# Patient Record
Sex: FEMALE | Race: WHITE | NOT HISPANIC OR LATINO | URBAN - METROPOLITAN AREA
[De-identification: names, ages, dates, MRNs, and addresses within clinical notes are randomized per-mention and may not be internally consistent; named-entity substitution may affect disease eponyms.]

---

## 2018-11-28 NOTE — PATIENT DISCUSSION
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 OCT 5 line raster done today.  Will refer to retina specialist ""

## 2019-07-10 ENCOUNTER — IMPORTED ENCOUNTER (OUTPATIENT)
Dept: URBAN - METROPOLITAN AREA CLINIC 50 | Facility: CLINIC | Age: 64
End: 2019-07-10

## 2019-07-11 ENCOUNTER — IMPORTED ENCOUNTER (OUTPATIENT)
Dept: URBAN - METROPOLITAN AREA CLINIC 50 | Facility: CLINIC | Age: 64
End: 2019-07-11

## 2019-07-12 ENCOUNTER — IMPORTED ENCOUNTER (OUTPATIENT)
Dept: URBAN - METROPOLITAN AREA CLINIC 50 | Facility: CLINIC | Age: 64
End: 2019-07-12

## 2019-08-12 ENCOUNTER — IMPORTED ENCOUNTER (OUTPATIENT)
Dept: URBAN - METROPOLITAN AREA CLINIC 50 | Facility: CLINIC | Age: 64
End: 2019-08-12

## 2019-08-12 NOTE — PATIENT DISCUSSION
"""Based on increased c/d ratio OU. IOP stable.  Testing has been started and will evaluate results ""

## 2019-08-13 ENCOUNTER — IMPORTED ENCOUNTER (OUTPATIENT)
Dept: URBAN - METROPOLITAN AREA CLINIC 50 | Facility: CLINIC | Age: 64
End: 2019-08-13

## 2019-10-10 ENCOUNTER — IMPORTED ENCOUNTER (OUTPATIENT)
Dept: URBAN - METROPOLITAN AREA CLINIC 50 | Facility: CLINIC | Age: 64
End: 2019-10-10

## 2019-11-14 ENCOUNTER — IMPORTED ENCOUNTER (OUTPATIENT)
Dept: URBAN - METROPOLITAN AREA CLINIC 50 | Facility: CLINIC | Age: 64
End: 2019-11-14

## 2020-06-03 ENCOUNTER — IMPORTED ENCOUNTER (OUTPATIENT)
Dept: URBAN - METROPOLITAN AREA CLINIC 50 | Facility: CLINIC | Age: 65
End: 2020-06-03

## 2020-06-04 ENCOUNTER — IMPORTED ENCOUNTER (OUTPATIENT)
Dept: URBAN - METROPOLITAN AREA CLINIC 50 | Facility: CLINIC | Age: 65
End: 2020-06-04

## 2020-12-10 ENCOUNTER — IMPORTED ENCOUNTER (OUTPATIENT)
Dept: URBAN - METROPOLITAN AREA CLINIC 50 | Facility: CLINIC | Age: 65
End: 2020-12-10

## 2021-02-19 ENCOUNTER — IMPORTED ENCOUNTER (OUTPATIENT)
Dept: URBAN - METROPOLITAN AREA CLINIC 50 | Facility: CLINIC | Age: 66
End: 2021-02-19

## 2021-03-01 ENCOUNTER — IMPORTED ENCOUNTER (OUTPATIENT)
Dept: URBAN - METROPOLITAN AREA CLINIC 50 | Facility: CLINIC | Age: 66
End: 2021-03-01

## 2021-03-08 ENCOUNTER — IMPORTED ENCOUNTER (OUTPATIENT)
Dept: URBAN - METROPOLITAN AREA CLINIC 50 | Facility: CLINIC | Age: 66
End: 2021-03-08

## 2021-03-22 NOTE — PATIENT DISCUSSION
"""H/o peripapillary CNVM OS. Treated with Avastin injection by Dr. Barron Zamudio. Stable.  Will continue ""

## 2021-03-24 ENCOUNTER — IMPORTED ENCOUNTER (OUTPATIENT)
Dept: URBAN - METROPOLITAN AREA CLINIC 50 | Facility: CLINIC | Age: 66
End: 2021-03-24

## 2021-03-30 ENCOUNTER — IMPORTED ENCOUNTER (OUTPATIENT)
Dept: URBAN - METROPOLITAN AREA CLINIC 50 | Facility: CLINIC | Age: 66
End: 2021-03-30

## 2021-04-07 ENCOUNTER — IMPORTED ENCOUNTER (OUTPATIENT)
Dept: URBAN - METROPOLITAN AREA CLINIC 50 | Facility: CLINIC | Age: 66
End: 2021-04-07

## 2021-04-07 NOTE — PATIENT DISCUSSION
"""S/P IOL OS: Sensar AAB00 20 +Omidria. Continue post operative instructions and drops per schedule.  """

## 2021-04-17 ASSESSMENT — PACHYMETRY
OS_CT_UM: 531
OD_CT_UM: 531
OD_CT_UM: 531
OS_CT_UM: 531
OS_CT_UM: 531
OD_CT_UM: 531
OS_CT_UM: 531
OS_CT_UM: 531
OD_CT_UM: 531
OD_CT_UM: 531
OS_CT_UM: 531
OD_CT_UM: 531
OS_CT_UM: 531
OD_CT_UM: 531
OS_CT_UM: 531
OD_CT_UM: 531
OS_CT_UM: 531
OS_CT_UM: 531
OD_CT_UM: 531
OD_CT_UM: 531
OS_CT_UM: 531
OS_CT_UM: 531
OD_CT_UM: 531
OS_CT_UM: 531
OD_CT_UM: 531

## 2021-04-17 ASSESSMENT — VISUAL ACUITY
OD_OTHER: 20/40. 20/50.
OS_CC: J1@ 16 IN
OS_PH: @ 17 IN
OS_CC: J1@ 14 IN
OS_CC: 20/25
OD_CC: 20/20
OS_BAT: 20/25
OS_CC: 20/20-2
OS_OTHER: 20/25. 20/25.
OD_BAT: 20/40
OD_CC: 20/25-
OD_CC: J1@ 14 IN
OD_PH: 20/30
OD_BAT: 20/30
OD_PH: @ 17 IN
OS_BAT: 20/40
OD_OTHER: 20/25. 20/25.
OD_BAT: 20/40
OD_CC: J1+@ 17 IN
OS_BAT: 20/40
OD_CC: 20/20-2
OD_OTHER: 20/40. 20/50.
OS_PH: @ 16 IN
OD_CC: 20/25
OS_CC: 20/80-
OS_CC: 20/20-2
OD_CC: 20/25
OS_CC: J1+@ 17 IN
OS_CC: 20/25
OS_BAT: 20/30
OS_BAT: 20/40
OS_CC: 20/20
OD_OTHER: 20/30. 20/50.
OD_BAT: 20/25
OD_PH: @ 16 IN
OS_SC: 20/70-
OS_CC: J1
OD_SC: 20/40-
OD_CC: J1@ 16 IN
OS_CC: 20/25-
OD_CC: 20/25-
OS_OTHER: 20/40. 20/50.
OS_PH: 20/50
OS_CC: 20/25
OS_OTHER: 20/30. 20/50.
OD_CC: 20/20-2
OD_SC: 20/50
OD_CC: J1
OS_OTHER: 20/40. 20/40.
OS_OTHER: 20/40. 20/40.

## 2021-04-17 ASSESSMENT — TONOMETRY
OS_IOP_MMHG: 15
OD_IOP_MMHG: 16
OD_IOP_MMHG: 14
OS_IOP_MMHG: 9
OD_IOP_MMHG: 17
OS_IOP_MMHG: 14
OD_IOP_MMHG: 13
OD_IOP_MMHG: 17
OD_IOP_MMHG: 13
OD_IOP_MMHG: 16
OS_IOP_MMHG: 16
OD_IOP_MMHG: 16
OS_IOP_MMHG: 15
OS_IOP_MMHG: 16
OS_IOP_MMHG: 13
OD_IOP_MMHG: 19
OD_IOP_MMHG: 20
OD_IOP_MMHG: 17
OS_IOP_MMHG: 17
OD_IOP_MMHG: 15
OD_IOP_MMHG: 14
OD_IOP_MMHG: 15
OS_IOP_MMHG: 15
OD_IOP_MMHG: 16
OS_IOP_MMHG: 15
OD_IOP_MMHG: 16
OS_IOP_MMHG: 14
OS_IOP_MMHG: 17
OS_IOP_MMHG: 8
OS_IOP_MMHG: 16
OD_IOP_MMHG: 13
OS_IOP_MMHG: 17
OS_IOP_MMHG: 14
OS_IOP_MMHG: 16

## 2021-04-26 NOTE — PATIENT DISCUSSION
Posterior vitreous detachment diagnosis was discussed with the patient. No retinal tears or holes were seen and this was explained to the patient. Retinal detachment warning signs including more floaters, flashing lights, or a curtain coming over their field of vision were told to the patient. If any of these symptoms are present, patient is instructed to contact me as soon as possible.

## 2021-04-26 NOTE — PATIENT DISCUSSION
H/o peripapillary CNVM OS. Treated with Avastin injection by Dr. Edel Mcnally. Stable. Will continue to monitor.

## 2021-05-10 ENCOUNTER — PREPPED CHART (OUTPATIENT)
Dept: URBAN - METROPOLITAN AREA CLINIC 50 | Facility: CLINIC | Age: 66
End: 2021-05-10

## 2021-05-10 ASSESSMENT — TONOMETRY
OS_IOP_MMHG: 12
OD_IOP_MMHG: 14
OS_IOP_MMHG: 13
OD_IOP_MMHG: 13

## 2021-05-10 ASSESSMENT — VISUAL ACUITY
OS_SC: 20/30
OD_SC: 20/30

## 2021-05-10 NOTE — PATIENT DISCUSSION
"""S/P IOL OS: Sensar AAB00 20 +Omidria.  Continue post operative instructions and drops per schedule. ""."

## 2021-05-11 ENCOUNTER — 4 WEEK POST-OP (OUTPATIENT)
Dept: URBAN - METROPOLITAN AREA CLINIC 50 | Facility: CLINIC | Age: 66
End: 2021-05-11

## 2021-05-11 DIAGNOSIS — Z98.42: ICD-10-CM

## 2021-05-11 DIAGNOSIS — Z96.1: ICD-10-CM

## 2021-05-11 PROCEDURE — 92015NC REFRACTION NO CHARGE

## 2021-05-11 PROCEDURE — 99024 POSTOP FOLLOW-UP VISIT: CPT

## 2021-05-11 ASSESSMENT — TONOMETRY
OD_IOP_MMHG: 13
OS_IOP_MMHG: 14
OD_IOP_MMHG: 14
OS_IOP_MMHG: 13

## 2021-05-11 ASSESSMENT — VISUAL ACUITY
OS_SC: 20/40
OU_CC: J1
OD_SC: 20/50

## 2021-07-12 ENCOUNTER — PROBLEM (OUTPATIENT)
Dept: URBAN - METROPOLITAN AREA CLINIC 53 | Facility: CLINIC | Age: 66
End: 2021-07-12

## 2021-07-12 DIAGNOSIS — H43.813: ICD-10-CM

## 2021-07-12 PROCEDURE — 92014 COMPRE OPH EXAM EST PT 1/>: CPT

## 2021-07-12 ASSESSMENT — TONOMETRY
OS_IOP_MMHG: 15
OD_IOP_MMHG: 15

## 2021-07-12 ASSESSMENT — VISUAL ACUITY
OD_GLARE: 20/25
OS_GLARE: 20/25
OS_GLARE: 20/25
OD_GLARE: 20/25

## 2021-08-06 NOTE — PATIENT DISCUSSION
H/o peripapillary CNVM OS. Treated with Avastin injection by Dr. Maycol Valentin. Stable. Will continue to monitor.

## 2022-01-31 NOTE — PATIENT DISCUSSION
H/o peripapillary CNVM OS. Treated with Avastin injection by Dr. Idalia Zuniga. Stable. Will continue to monitor.

## 2022-04-25 NOTE — PATIENT DISCUSSION
Per increased c/d ratio, OD>OS. IOP wnl, 16/16. (-) family hx. OCT RNFL April 2021 wnl OD/OS. Patient to schedule HVF/OCT (RNFL) next available.

## 2022-04-25 NOTE — PATIENT DISCUSSION
Recommended patient follow up with Dr. Lennox Davis in regards to patient's ongoing Floater/Flashes as she is already established with them.

## 2022-04-25 NOTE — PATIENT DISCUSSION
H/o peripapillary CNVM OS. Treated with Avastin injection by Dr. Dickson Peña. Will continue to monitor.
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
## 2022-05-26 NOTE — PATIENT DISCUSSION
Recommended patient follow up with Dr. Hansel Lee in regards to patient's ongoing Floater/Flashes as she is already established with them.

## 2022-05-26 NOTE — PATIENT DISCUSSION
H/o peripapillary CNVM OS. Treated with Avastin injection by Dr. Enamorado Board. Stable. Will continue to monitor.

## 2022-06-01 NOTE — PATIENT DISCUSSION
Recommended patient follow up with Dr. Dickson Manuel in regards to patient's ongoing Floater/Flashes as she is already established with them.

## 2022-06-01 NOTE — PATIENT DISCUSSION
H/o peripapillary CNVM OS. Treated with Avastin injection by Dr. Nydia Merritt. Stable. Will continue to monitor.

## 2022-06-01 NOTE — PATIENT DISCUSSION
Signs of ocular involvement. Mild iritis and mild irregular staining evident today. Recommend Pred Acetate TID OD and PF tears Q2h OD. Advised to RTC sooner if increased pain, redness, or visual changes. RTC in 1 week follow up.

## 2022-06-01 NOTE — PATIENT DISCUSSION
Patient saw her PCP this morning. Patient was Rx Valtrex 1g and Gabapentin by PCP (has not picked up either medication from pharmacy). Use pf tears every few hours OD. Start   3 times a day OD.

## 2022-06-01 NOTE — PATIENT DISCUSSION
Per increased c/d ratio, OD>OS. IOP wnl, 16/16. (-) family hx. OCT RNFL (0526/2022) wnl and stable. HVF (05/26/2022) wnl OD/OS. PACHS [id number]. Will continue to monitor.

## 2022-06-08 NOTE — PATIENT DISCUSSION
Patient was Rx Valtrex 1g and Gabapentin by PCP. Patient is not taking Gabapentin she states it didnt help and made her nauseas. Patient is still taking Valtrex. Patient states that she has a constant head ache and advised patient to follow up with her PCP.

## 2022-06-08 NOTE — PATIENT DISCUSSION
Improving. Signs of ocular involvement at previous exam. Mild iritis and mild irregular staining evident on previous exam. Minimal iritis evident today. Continue Pred Acetate BID for 4 days then QDAY for 3 days then stop. Continue PF tears 3-4 times a day.   Advised to RTC sooner if increased pain, redness, or visual changes.

## 2022-06-08 NOTE — PATIENT DISCUSSION
Recommended patient follow up with Dr. Edel Mcnally in regards to patient's ongoing Floater/Flashes as she is already established with them.

## 2022-06-08 NOTE — PATIENT DISCUSSION
Per increased c/d ratio, OD>OS. IOP wnl, 16/16. (-) family hx. OCT RNFL (0526/2022) wnl and stable. HVF (05/26/2022) wnl OD/OS. PACHS [id number]. Will continue to monitor.

## 2022-06-08 NOTE — PATIENT DISCUSSION
H/o peripapillary CNVM OS. Treated with Avastin injection by Dr. Christi Devine. Stable. Will continue to monitor.

## 2022-06-20 NOTE — PATIENT DISCUSSION
Per increased c/d ratio, OD>OS. IOP wnl, 16/16. (-) family hx. OCT RNFL (0526/2022) wnl and stable. HVF (05/26/2022) wnl OD/OS. PACHS [id number]. Will continue to monitor.

## 2022-06-20 NOTE — PATIENT DISCUSSION
H/o peripapillary CNVM OS. Treated with Avastin injection by Dr. Ajith Moeller. Stable. Will continue to monitor.

## 2022-06-20 NOTE — PATIENT DISCUSSION
Recommended patient follow up with Dr. Barron Zamudio in regards to patient's ongoing Floater/Flashes as she is already established with them.

## 2022-06-20 NOTE — PATIENT DISCUSSION
December 19, 2019       Outside Provider      Patient: Brice Caballero   YOB: 2004   Date of Visit: 12/19/2019       Dear  Provider:    Thank you for referring Brice Caballero to me for evaluation. Below are my notes for this visit with him.    If you have questions, please do not hesitate to call me. I look forward to following your patient along with you.      Sincerely,        Gil Zazueta MD        CC: No Recipients  Gil Zazueta MD  12/19/2019  3:58 PM  Sign when Signing Visit  Pediatric GI Consult  Brice Caballero  2004  [id number]    Patient ID: Brice Caballero is a 15 year old male.    Brice is accompanied by Mother .      No chief complaint on file.      History of Present Illness:    Brice comes today with his mother for a consultation, he is a 15 years old boy who for the last 3 months has been experiencing episodes of chest pain, anterior mid and right side, this has been more common with meals along with difficulty swallowing some solid foods, he reports frequent episodes when he would feel food stuck in throat/esophagus, most of time will pass and sometimes he would vomit, no history of food impactions requiring ER visit. He did visit ER once for chest pain, as per mother had normal evaluation including EKG. No weighty loss, has been treated with Omeprazole and antibiotics (Amoxicillin and Clarythromcycin) no change in symptoms. No gi bleeding.      Review of Systems  Constitutional: no fever or WT loss  HEENT: no jaundice or conjunctivitis  Skin: no itching or rashes  Psychiatric: no confusion, disorientation or hallucination  Musculoskeletal: No joint pain, weakness or numbness  Endocrine: No excessive thirst, heat or cold intolerance.  Respiratory: No wheezing or coughing  Cardiovascular: no shortness of breath or heart murmur  Genitourinary: no problems on urination, pain, discharge or bleeding  Neurological: No numbness,  Patient completed Valtrex and Gabapentin. Patient states headache has subsided from whole head to just the side of shingles. Rash has resolved. Iritis was resolved at last exam. Mild iritis evident today after finishing Pred and Valtrex. Restart Pred OD QID for 1 week, TID for 1 week, then will continue taper depending on iritis. Restart Valacyclovir TID by mouth. tingling, paralysis or seizures  Hematological: No swollen, anemia or bleeding tendency    PMHX: no previous hospitalizations, no other medications    No previous surgeries    Family history negative for GI, liver or metabolic disorders      Brice   No current outpatient medications on file.     No current facility-administered medications for this visit.        Brice has No Known Allergies.    There were no vitals filed for this visit.     Physical Examination   Visit Vitals  Ht 5' 10.87\" (1.8 m)   Wt 74.5 kg (164 lb 2.1 oz)   BMI 22.98 kg/m²       Constitutional:Alert, active, no distress  HEENT:  no jaundice, conjunctivitis, mouth exudates or ulcers  Neck: supple, no masses, no nodes  Chest: symmetric, good entry of air bilaterally with clear breath sounds  Heart: S1 S2, no murmurs, good capillary refill  Abd: non distended, BS present, soft, non tender, no masses, no hepatosplenomegaly  Neuro: alert, active, oriented  Extr: FROM, no swelling  Skin: no jaundice, no lesions, no rashes  Psych: appropriate mood and affect        Lab Results:  No results found for any previous visit.       Assessment & Recommendations:    A/ Recurrent episodes of dysphagia with solid foods along with chest pain with meals raises concern of upper gi pathology involving most likely esophagus,  Anatomical abnormalities like strictures as well as mucosal disease/edema causing narrowing of lumen of esophagus or affecting motility also in differential including esophagitis peptic or eosinophilic, achalasia.    P/ Will obtain upper gi       Will also arrange for EGD with prior upper gi result.       Procedure benefits and risks (including anesthesia, aspiration, bleeding, duodenal hematoma, perforation but not limited to) discussed.      Gil Zazueta MD  12/19/2019

## 2022-07-14 NOTE — PATIENT DISCUSSION
Recommended patient follow up with Dr. Moni Amaya in regards to patient's ongoing Floater/Flashes as she is already established with them.

## 2022-07-14 NOTE — PATIENT DISCUSSION
Per increased c/d ratio, OD>OS. IOP wnl, 16/16. (-) family hx. OCT RNFL (0526/2022) wnl and stable. HVF (05/26/2022) wnl OD/OS. PACHS [id number]. Will continue to monitor.

## 2022-07-14 NOTE — PATIENT DISCUSSION
Patient states symptoms have resolved. Patient completed Valtrex and Gabapentin. Headaches resolved. Iritis resolved today. Continue Pred OD Qday for 3 days then stop.

## 2023-04-21 ENCOUNTER — COMPREHENSIVE EXAM (OUTPATIENT)
Dept: URBAN - METROPOLITAN AREA CLINIC 49 | Facility: CLINIC | Age: 68
End: 2023-04-21

## 2023-04-21 DIAGNOSIS — H43.813: ICD-10-CM

## 2023-04-21 DIAGNOSIS — D23.111: ICD-10-CM

## 2023-04-21 DIAGNOSIS — H02.401: ICD-10-CM

## 2023-04-21 DIAGNOSIS — H35.362: ICD-10-CM

## 2023-04-21 DIAGNOSIS — H52.4: ICD-10-CM

## 2023-04-21 DIAGNOSIS — H40.013: ICD-10-CM

## 2023-04-21 PROCEDURE — 92134 CPTRZ OPH DX IMG PST SGM RTA: CPT

## 2023-04-21 PROCEDURE — 92014 COMPRE OPH EXAM EST PT 1/>: CPT

## 2023-04-21 ASSESSMENT — KERATOMETRY
OS_K2POWER_DIOPTERS: 47.75
OD_AXISANGLE_DEGREES: 148
OS_AXISANGLE_DEGREES: 15
OD_K2POWER_DIOPTERS: 48.00
OD_AXISANGLE2_DEGREES: 58
OD_K1POWER_DIOPTERS: 46.75
OS_K1POWER_DIOPTERS: 47.50
OS_AXISANGLE2_DEGREES: 105

## 2023-04-21 ASSESSMENT — VISUAL ACUITY
OD_CC: J2
OD_GLARE: 20/50
OS_SC: 20/25
OS_CC: J1
OD_GLARE: 20/40
OD_PH: 20/25
OS_CC: 20/20-1
OU_CC: J1+ @ 16"
OS_GLARE: 20/25
OD_CC: 20/30
OU_CC: 20/20
OD_SC: 20/60
OS_GLARE: 20/25

## 2023-04-21 ASSESSMENT — TONOMETRY
OD_IOP_MMHG: 15
OD_IOP_MMHG: 16
OS_IOP_MMHG: 14
OS_IOP_MMHG: 15

## 2023-05-25 ENCOUNTER — DIAGNOSTICS ONLY (OUTPATIENT)
Dept: URBAN - METROPOLITAN AREA CLINIC 49 | Facility: CLINIC | Age: 68
End: 2023-05-25

## 2023-05-25 DIAGNOSIS — H40.013: ICD-10-CM

## 2023-05-25 PROCEDURE — 92083 EXTENDED VISUAL FIELD XM: CPT

## 2023-05-25 PROCEDURE — 92133 CPTRZD OPH DX IMG PST SGM ON: CPT

## 2023-05-25 ASSESSMENT — KERATOMETRY
OS_K1POWER_DIOPTERS: 47.50
OS_AXISANGLE_DEGREES: 15
OS_AXISANGLE2_DEGREES: 105
OD_K1POWER_DIOPTERS: 46.75
OD_K2POWER_DIOPTERS: 48.00
OS_K2POWER_DIOPTERS: 47.75
OD_AXISANGLE_DEGREES: 148
OD_AXISANGLE2_DEGREES: 58